# Patient Record
Sex: FEMALE | Race: OTHER | HISPANIC OR LATINO
[De-identification: names, ages, dates, MRNs, and addresses within clinical notes are randomized per-mention and may not be internally consistent; named-entity substitution may affect disease eponyms.]

---

## 2021-08-30 ENCOUNTER — APPOINTMENT (OUTPATIENT)
Dept: UROGYNECOLOGY | Facility: CLINIC | Age: 71
End: 2021-08-30
Payer: SELF-PAY

## 2021-08-30 ENCOUNTER — OUTPATIENT (OUTPATIENT)
Dept: OUTPATIENT SERVICES | Facility: HOSPITAL | Age: 71
LOS: 1 days | Discharge: HOME | End: 2021-08-30

## 2021-08-30 VITALS — SYSTOLIC BLOOD PRESSURE: 140 MMHG | DIASTOLIC BLOOD PRESSURE: 76 MMHG | WEIGHT: 167.19 LBS

## 2021-08-30 DIAGNOSIS — Z87.42 PERSONAL HISTORY OF OTHER DISEASES OF THE FEMALE GENITAL TRACT: ICD-10-CM

## 2021-08-30 DIAGNOSIS — N81.4 UTEROVAGINAL PROLAPSE, UNSPECIFIED: ICD-10-CM

## 2021-08-30 DIAGNOSIS — Z87.891 PERSONAL HISTORY OF NICOTINE DEPENDENCE: ICD-10-CM

## 2021-08-30 DIAGNOSIS — R35.1 NOCTURIA: ICD-10-CM

## 2021-08-30 PROCEDURE — 51701 INSERT BLADDER CATHETER: CPT

## 2021-08-30 PROCEDURE — 99205 OFFICE O/P NEW HI 60 MIN: CPT | Mod: 25

## 2021-08-31 DIAGNOSIS — N81.10 CYSTOCELE, UNSPECIFIED: ICD-10-CM

## 2021-08-31 DIAGNOSIS — N95.2 POSTMENOPAUSAL ATROPHIC VAGINITIS: ICD-10-CM

## 2021-08-31 DIAGNOSIS — R35.1 NOCTURIA: ICD-10-CM

## 2021-08-31 NOTE — ASSESSMENT
[FreeTextEntry1] : Prolapse of anterior vaginal wall-\par The patient was counseled regarding the possible natural progression of prolapse and the clinical consequences of worsening prolapse. The stage and the location of the prolapse was reviewed with the patient (currently Stage III). She was counseled regarding the management strategies including observation,, pessary placement and surgery. At this point she elected observation. The patient voiced understanding and agrees with the plan.\par \par Vaginal atrophy-\par Patient was counseled regarding etiology and treatment of vaginal atrophy. We discussed treatment with vaginal estrogen cream, including R/B/A. She was prescribed vaginal estrogen cream and will start using it as outlined in the counseling portion.

## 2021-08-31 NOTE — REASON FOR VISIT
[Source: ______] : History obtained from [unfilled] [FreeTextEntry1] : [id number] [TWNoteComboBox1] : [place] [FreeTextEntry2] : Vaginal bulge

## 2021-08-31 NOTE — HISTORY OF PRESENT ILLNESS
[FreeTextEntry1] : Pt is a 70 yo para 9 ( x9) with pelvic floor dysfunction here for urogynecologic evaluation. She describes:\par \par Chief PFD: Patient reports bulge and pressure in vagina x 1 month She feels the bulge when she is urinating. This is bothersome to her but not painful. She states that she has to push the bulge up when she cleans herself after urinating. She does endorse post-void dribbling. Urinates 5-6 times per day, nocturia 2-3 times per night.\par \par Pelvic organ prolapse: as above\par \par Stress urinary incontinence: denies\par \par Overactive bladder syndrome: voids 5-6 times/day, nocturia 2-3x, denies urgency, denies urge incontinence\par \par Voiding dysfunction: + Incomplete bladder emptying, + hesitancy\par \par Lower urinary tract/vaginal symptoms: UTIs per year, hematuria, dysuria, bladder pain\par \par Fecal incontinence: Denies\par \par Defecatory dysfunction: Denies constipation, diarrhea. She has 1-2 BM's per day.\par Never had colonoscopy.\par \par Sexual dysfunction: Not currently sexually active (last time was 1 month ago).  She does have a partner and is not sure if she will be sexually active in the future. Denies pain with intercourse.\par \par Pelvic pain: Denies\par \par Vaginal dryness: Denies\par \par Her pelvic floor symptoms are significantly bothersome and negatively impacting her quality of life.

## 2021-08-31 NOTE — PHYSICAL EXAM
[Chaperone Present] : A chaperone was present in the examining room during all aspects of the physical examination [FreeTextEntry1] : Void:  50 cc\par PVR: 15 cc\par Urethra was prepped in sterile fashion and then a sterile catheter was used by me to drain the bladder.\par \par GH: 5    pB: 2.5  TVL: 8  C: -4.5  D: -6  Aa: +3  Ba: +4  Ap: 0  Bp: 0\par  \par neg cough stress test\par + atrophy\par urethral caruncle: neg\par no vestibular tenderness\par prolapse: +\par + urethral hypermobility\par no levator ani tenderness\par urethral tenderness: neg\par bladder tenderness: neg\par cervical tenderness: neg\par palpation of uterus: WNL size, no tenderness\par good sphincter tone\par 1/5 Kegel\par

## 2021-09-01 LAB — BACTERIA UR CULT: NORMAL

## 2021-10-05 ENCOUNTER — APPOINTMENT (OUTPATIENT)
Dept: OPHTHALMOLOGY | Facility: CLINIC | Age: 71
End: 2021-10-05

## 2022-02-28 ENCOUNTER — APPOINTMENT (OUTPATIENT)
Dept: UROGYNECOLOGY | Facility: CLINIC | Age: 72
End: 2022-02-28

## 2022-08-05 ENCOUNTER — OUTPATIENT (OUTPATIENT)
Dept: OUTPATIENT SERVICES | Facility: HOSPITAL | Age: 72
LOS: 1 days | Discharge: HOME | End: 2022-08-05

## 2022-08-05 ENCOUNTER — APPOINTMENT (OUTPATIENT)
Dept: UROGYNECOLOGY | Facility: CLINIC | Age: 72
End: 2022-08-05

## 2022-08-05 VITALS — DIASTOLIC BLOOD PRESSURE: 70 MMHG | SYSTOLIC BLOOD PRESSURE: 130 MMHG

## 2022-08-05 DIAGNOSIS — N81.10 CYSTOCELE, UNSPECIFIED: ICD-10-CM

## 2022-08-05 DIAGNOSIS — N95.2 POSTMENOPAUSAL ATROPHIC VAGINITIS: ICD-10-CM

## 2022-08-05 PROCEDURE — 99214 OFFICE O/P EST MOD 30 MIN: CPT

## 2022-08-05 RX ORDER — ESTRADIOL 0.1 MG/G
0.1 CREAM VAGINAL
Qty: 1 | Refills: 6 | Status: ACTIVE | COMMUNITY
Start: 2021-08-30

## 2022-08-05 RX ORDER — CAPTOPRIL 50 MG/1
TABLET ORAL
Refills: 0 | Status: DISCONTINUED | COMMUNITY
End: 2022-08-05

## 2022-08-05 NOTE — PHYSICAL EXAM
[Chaperone Present] : A chaperone was present in the examining room during all aspects of the physical examination [FreeTextEntry1] : GH: 5-6\par TVL: 10 [No Acute Distress] : in no acute distress [Well developed] : well developed [Well Nourished] : ~L well nourished

## 2022-08-05 NOTE — DISCUSSION/SUMMARY
[FreeTextEntry1] : Incomplete uterovaginal prolapse\par Pt chose observation again as she is not bothered by the prolapse\par \par Vaginal atrophy\par Cont estrogen cream as needed\par \par Follow up as needed

## 2022-08-05 NOTE — HISTORY OF PRESENT ILLNESS
[FreeTextEntry1] : Patient is here for her 12 months follow up visit.  used via phone for today's visit. \par Last seen on 21 as a new pt with a uterovaginal prolapse. \par \par H/o  x 9\par Not sexually active\par \par GH: 5 pB: 2.5 TVL: 8 C: -4.5 D: -6 Aa: +3 Ba: +4 Ap: 0 Bp: 0\par \par Estrace cream uses occasionally\par Pt chose observation. \par  \par Today pt states that she is doing well. She is using the estrogen cream for atrophy sometimes. She is not bothered by the prolapse. Not interested in any intervention at this time.

## 2022-08-24 ENCOUNTER — OUTPATIENT (OUTPATIENT)
Dept: OUTPATIENT SERVICES | Facility: HOSPITAL | Age: 72
LOS: 1 days | Discharge: HOME | End: 2022-08-24

## 2022-08-24 ENCOUNTER — APPOINTMENT (OUTPATIENT)
Dept: OPHTHALMOLOGY | Facility: CLINIC | Age: 72
End: 2022-08-24

## 2022-08-24 PROCEDURE — 92014 COMPRE OPH EXAM EST PT 1/>: CPT

## 2022-09-01 ENCOUNTER — EMERGENCY (EMERGENCY)
Facility: HOSPITAL | Age: 72
LOS: 0 days | Discharge: HOME | End: 2022-09-01
Attending: EMERGENCY MEDICINE | Admitting: EMERGENCY MEDICINE
Payer: SELF-PAY

## 2022-09-01 VITALS
DIASTOLIC BLOOD PRESSURE: 75 MMHG | RESPIRATION RATE: 18 BRPM | OXYGEN SATURATION: 98 % | TEMPERATURE: 99 F | SYSTOLIC BLOOD PRESSURE: 187 MMHG | HEART RATE: 67 BPM

## 2022-09-01 DIAGNOSIS — K02.9 DENTAL CARIES, UNSPECIFIED: ICD-10-CM
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
## 2022-09-01 DIAGNOSIS — K08.89 OTHER SPECIFIED DISORDERS OF TEETH AND SUPPORTING STRUCTURES: ICD-10-CM
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
## 2022-09-01 DIAGNOSIS — S02.5XXA FRACTURE OF TOOTH (TRAUMATIC), INITIAL ENCOUNTER FOR CLOSED FRACTURE: ICD-10-CM

## 2022-09-01 PROCEDURE — 99284 EMERGENCY DEPT VISIT MOD MDM: CPT

## 2022-09-01 NOTE — ED PROVIDER NOTE - OBJECTIVE STATEMENT
L lower molar chipped this AM. Patient is a 72y F presenting for evaluation of a chipped tooth. Patient with pain on palpation of tooth #15. Able to tolerate PO and secretions. No swelling, fevers, chills.

## 2022-09-01 NOTE — ED PROVIDER NOTE - CLINICAL SUMMARY MEDICAL DECISION MAKING FREE TEXT BOX
72yF p/w chipped tooth while eating this morning - no airway compromise, bleeding or infection - no concern for inhaled or swallowed tooth fragment - will escort to dental clinic for further care.

## 2022-09-01 NOTE — ED PROVIDER NOTE - PHYSICAL EXAMINATION
Vital Signs: I have reviewed the initial vital signs.  Constitutional: appears stated age, no acute distress  HEENT: (+) Dental caries, pain with palpation of tooth 15.   Cardiovascular: regular rate, regular rhythm, well-perfused extremities  Respiratory: unlabored respiratory effort,   Gastrointestinal: soft, non-tender abdomen,   Psychiatric: appropriate mood, appropriate affect

## 2022-09-01 NOTE — CONSULT NOTE ADULT - SUBJECTIVE AND OBJECTIVE BOX
Patient is a 72y old  Female who presents with a chief complaint of pain on the upper left. Patient presents with her son who is translating for her    HPI: pain is exacerbated by chewing and cold       PAST MEDICAL & SURGICAL HISTORY:    ( -  ) heart valve replacement  ( -  ) joint replacement  ( -  ) pregnancy    MEDICATIONS  (STANDING):    MEDICATIONS  (PRN):      Allergies      Intolerances        FAMILY HISTORY:      *SOCIAL HISTORY: ( -  ) Tobacco; ( -  ) ETOH    *Last Dental Visit:    Vital Signs Last 24 Hrs  T(C): 37.1 (01 Sep 2022 14:38), Max: 37.1 (01 Sep 2022 14:38)  T(F): 98.8 (01 Sep 2022 14:38), Max: 98.8 (01 Sep 2022 14:38)  HR: 67 (01 Sep 2022 14:38) (67 - 67)  BP: 187/75 (01 Sep 2022 14:38) (187/75 - 187/75)  BP(mean): --  RR: 18 (01 Sep 2022 14:38) (18 - 18)  SpO2: 98% (01 Sep 2022 14:38) (98% - 98%)    Parameters below as of 01 Sep 2022 14:38  Patient On (Oxygen Delivery Method): room air    Vitals taken in clinic:  BP: 149/67  P: 74      LABS: no lab results available for this patient.        EOE:  TMJ ( -  ) clicks                     (  - ) pops                     ( -  ) crepitus             Mandible <<FROM>>             Facial bones and MOM <<grossly intact>>             ( -  ) trismus             ( -  ) lymphadenopathy             ( -  ) swelling             ( -  ) asymmetry             ( -  ) palpation             ( -  ) dyspnea             ( -  ) dysphagia             ( -  ) loss of consciousness    IOE:  permanent multiple carious teeth and multiple missing teeth           hard/soft palate:  (  - ) palatal torus, <<No pathology noted>>           tongue/FOM <<No pathology noted>>           labial/buccal mucosa <<No pathology noted>>           ( +  ) percussion           ( +  ) palpation           ( -  ) swelling            ( -  ) abscess           ( -  ) sinus tract    Dentition present: permanent   Mobility: #16   Caries: #15        *DENTAL RADIOGRAPHS:1 periapical and 1 panoramic radiograph taken    RADIOLOGY & ADDITIONAL STUDIES:    *ASSESSMENT: #15 has caries extending onto roots structure and is non-restorable. #15 is sensitive to percussion.       *PLAN: extraction #15. Patient is informed that #16 is mobile and needs extraction but patient refused to extract #16.     PROCEDURE:   Verbal and written consent given.  Anesthesia: 1 and 1/2 carpules of 4% septocaine with 1:100,000 epinephrine administered as local infiltration.   Treatment: Treatment consequences explained to patient as per OS sheet dated 07/13/00. throat pack placed. #15 elevated and extracted non-surgically. site curetted and irrigated. hemostasis achieved. post-op radiograph taken-negative. post-op instructions given verbal and written. Patient is informed that she has a broken down tooth on the right side upper and lower and she needs a comprehensive exam.     RECOMMENDATIONS:  1) Dental F/U with [facility] clinic for comprehensive dental care.   2) If any difficulty swallowing/breathing, fever occur, return to ER.     Екатерина Zhang, pager #[phone]

## 2022-09-01 NOTE — CONSULT NOTE ADULT - ASSESSMENT
Patient is a 72y old  Female who presents with a chief complaint of pain on the upper left. Patient presents with her son who is translating for her    RECOMMENDATIONS:  1) Dental F/U with [facility] for comprehensive dental care.   2) If any difficulty swallowing/breathing, fever occur, return to ER.     Екатерина Zhang, pager #[phone]

## 2022-09-01 NOTE — ED PROVIDER NOTE - ATTENDING CONTRIBUTION TO CARE
72yF p/w dental injury - chipped her L lower molar eating this morning - no fever, bleeding or purulent drainage - has not seen dentist today

## 2022-09-07 DIAGNOSIS — H25.13 AGE-RELATED NUCLEAR CATARACT, BILATERAL: ICD-10-CM

## 2022-09-07 DIAGNOSIS — H11.042 PERIPHERAL PTERYGIUM, STATIONARY, LEFT EYE: ICD-10-CM

## 2022-09-07 DIAGNOSIS — H11.041 PERIPHERAL PTERYGIUM, STATIONARY, RIGHT EYE: ICD-10-CM

## 2022-09-27 ENCOUNTER — APPOINTMENT (OUTPATIENT)
Dept: OPHTHALMOLOGY | Facility: CLINIC | Age: 72
End: 2022-09-27

## 2022-10-06 ENCOUNTER — NON-APPOINTMENT (OUTPATIENT)
Age: 72
End: 2022-10-06

## 2022-10-06 ENCOUNTER — OUTPATIENT (OUTPATIENT)
Dept: OUTPATIENT SERVICES | Facility: HOSPITAL | Age: 72
LOS: 1 days | Discharge: HOME | End: 2022-10-06

## 2022-10-06 ENCOUNTER — APPOINTMENT (OUTPATIENT)
Dept: INTERNAL MEDICINE | Facility: CLINIC | Age: 72
End: 2022-10-06

## 2022-10-06 VITALS — SYSTOLIC BLOOD PRESSURE: 142 MMHG | DIASTOLIC BLOOD PRESSURE: 78 MMHG

## 2022-10-06 VITALS
DIASTOLIC BLOOD PRESSURE: 82 MMHG | TEMPERATURE: 96.7 F | HEIGHT: 64.5 IN | BODY MASS INDEX: 28.67 KG/M2 | WEIGHT: 170 LBS | HEART RATE: 71 BPM | SYSTOLIC BLOOD PRESSURE: 151 MMHG | OXYGEN SATURATION: 96 %

## 2022-10-06 DIAGNOSIS — Z86.79 PERSONAL HISTORY OF OTHER DISEASES OF THE CIRCULATORY SYSTEM: ICD-10-CM

## 2022-10-06 DIAGNOSIS — E55.9 VITAMIN D DEFICIENCY, UNSPECIFIED: ICD-10-CM

## 2022-10-06 DIAGNOSIS — Z86.69 PERSONAL HISTORY OF OTHER DISEASES OF THE NERVOUS SYSTEM AND SENSE ORGANS: ICD-10-CM

## 2022-10-06 DIAGNOSIS — Z12.39 ENCOUNTER FOR OTHER SCREENING FOR MALIGNANT NEOPLASM OF BREAST: ICD-10-CM

## 2022-10-06 DIAGNOSIS — I10 ESSENTIAL (PRIMARY) HYPERTENSION: ICD-10-CM

## 2022-10-06 PROCEDURE — 99203 OFFICE O/P NEW LOW 30 MIN: CPT | Mod: GC

## 2022-10-06 RX ORDER — CAPTOPRIL 25 MG/1
25 TABLET ORAL DAILY
Qty: 30 | Refills: 5 | Status: ACTIVE | COMMUNITY
Start: 2022-10-06 | End: 1900-01-01

## 2022-10-06 NOTE — HISTORY OF PRESENT ILLNESS
[FreeTextEntry1] : establish care\par Needs clearance for cataract surgery.  [de-identified] : 72 year old female with past medical history of HTN presents to establish care. She saw her ophthalmologist in August for cataract surgery. She needs clearance from her PCP before cataract surgery as she has hypertension. Has no cardiopulmonary symptoms feels well.

## 2022-10-06 NOTE — ASSESSMENT
[FreeTextEntry1] : 72 year old female with past medical history of HTN presents to establish care. She saw her ophthalmologist in August for cataract surgery. She needs clearance from her PCP before cataract surgery as she has hypertension\par \par #HTN\par - Patient BP in office is 151/82\par - Patient hasn't taken Captopril in 2 days \par - Patient reports that she ran out of captopril \par - refill given\par \par \par # Pre-op\par - low risk surgical patient. However, would suggest  getting baseline labs prior to cataract surgery.\par \par \par #HCM\par - patient is planning on returning to [place] however, no suggest Pneumonia and Flu vaccine\par - check mammogram

## 2022-10-06 NOTE — PHYSICAL EXAM
[No Acute Distress] : no acute distress [No JVD] : no jugular venous distention [Soft] : abdomen soft [Non Tender] : non-tender [Non-distended] : non-distended

## 2022-10-06 NOTE — REVIEW OF SYSTEMS
[Headache] : headache [Fever] : no fever [Chills] : no chills [Night Sweats] : no night sweats [Discharge] : no discharge [Vision Problems] : no vision problems [Chest Pain] : no chest pain [Palpitations] : no palpitations [Abdominal Pain] : no abdominal pain

## 2022-10-07 PROBLEM — E55.9 VITAMIN D DEFICIENCY: Status: ACTIVE | Noted: 2022-10-07

## 2022-10-07 LAB
ALBUMIN SERPL ELPH-MCNC: 4.7 G/DL
ALP BLD-CCNC: 130 U/L
ALT SERPL-CCNC: 26 U/L
ANION GAP SERPL CALC-SCNC: 12 MMOL/L
AST SERPL-CCNC: 23 U/L
BASOPHILS # BLD AUTO: 0.09 K/UL
BASOPHILS NFR BLD AUTO: 1.3 %
BILIRUB SERPL-MCNC: 0.5 MG/DL
BUN SERPL-MCNC: 17 MG/DL
CALCIUM SERPL-MCNC: 9.7 MG/DL
CHLORIDE SERPL-SCNC: 102 MMOL/L
CHOLEST SERPL-MCNC: 210 MG/DL
CO2 SERPL-SCNC: 25 MMOL/L
CREAT SERPL-MCNC: 0.9 MG/DL
EGFR: 68 ML/MIN/1.73M2
EOSINOPHIL # BLD AUTO: 0.17 K/UL
EOSINOPHIL NFR BLD AUTO: 2.5 %
ESTIMATED AVERAGE GLUCOSE: 123 MG/DL
GLUCOSE SERPL-MCNC: 120 MG/DL
HBA1C MFR BLD HPLC: 5.9 %
HCT VFR BLD CALC: 39.4 %
HDLC SERPL-MCNC: 42 MG/DL
HGB BLD-MCNC: 13 G/DL
IMM GRANULOCYTES NFR BLD AUTO: 0.1 %
LDLC SERPL CALC-MCNC: 131 MG/DL
LYMPHOCYTES # BLD AUTO: 2.38 K/UL
LYMPHOCYTES NFR BLD AUTO: 35.4 %
MAN DIFF?: NORMAL
MCHC RBC-ENTMCNC: 30 PG
MCHC RBC-ENTMCNC: 33 G/DL
MCV RBC AUTO: 90.8 FL
MONOCYTES # BLD AUTO: 0.59 K/UL
MONOCYTES NFR BLD AUTO: 8.8 %
NEUTROPHILS # BLD AUTO: 3.49 K/UL
NEUTROPHILS NFR BLD AUTO: 51.9 %
NONHDLC SERPL-MCNC: 168 MG/DL
PLATELET # BLD AUTO: 380 K/UL
POTASSIUM SERPL-SCNC: 5 MMOL/L
PROT SERPL-MCNC: 7.5 G/DL
RBC # BLD: 4.34 M/UL
RBC # FLD: 12.8 %
SODIUM SERPL-SCNC: 139 MMOL/L
TRIGL SERPL-MCNC: 184 MG/DL
WBC # FLD AUTO: 6.73 K/UL

## 2022-10-07 RX ORDER — CHOLECALCIFEROL (VITAMIN D3) 1250 MCG
1.25 MG CAPSULE ORAL
Qty: 12 | Refills: 0 | Status: ACTIVE | COMMUNITY
Start: 2022-10-07 | End: 1900-01-01

## 2022-10-12 DIAGNOSIS — H26.9 UNSPECIFIED CATARACT: ICD-10-CM

## 2022-10-12 DIAGNOSIS — E55.9 VITAMIN D DEFICIENCY, UNSPECIFIED: ICD-10-CM

## 2022-10-12 DIAGNOSIS — I10 ESSENTIAL (PRIMARY) HYPERTENSION: ICD-10-CM

## 2022-10-27 ENCOUNTER — APPOINTMENT (OUTPATIENT)
Dept: INTERNAL MEDICINE | Facility: CLINIC | Age: 72
End: 2022-10-27

## 2022-10-27 ENCOUNTER — OUTPATIENT (OUTPATIENT)
Dept: OUTPATIENT SERVICES | Facility: HOSPITAL | Age: 72
LOS: 1 days | Discharge: HOME | End: 2022-10-27

## 2022-10-27 VITALS
HEART RATE: 66 BPM | SYSTOLIC BLOOD PRESSURE: 147 MMHG | TEMPERATURE: 98.1 F | OXYGEN SATURATION: 97 % | DIASTOLIC BLOOD PRESSURE: 83 MMHG | WEIGHT: 175 LBS | HEIGHT: 64 IN | BODY MASS INDEX: 29.88 KG/M2

## 2022-10-27 DIAGNOSIS — H26.9 UNSPECIFIED CATARACT: ICD-10-CM

## 2022-10-27 DIAGNOSIS — Z00.00 ENCOUNTER FOR GENERAL ADULT MEDICAL EXAMINATION W/OUT ABNORMAL FINDINGS: ICD-10-CM

## 2022-10-27 DIAGNOSIS — R73.03 PREDIABETES.: ICD-10-CM

## 2022-10-27 DIAGNOSIS — I10 ESSENTIAL (PRIMARY) HYPERTENSION: ICD-10-CM

## 2022-10-27 LAB — 25(OH)D3 SERPL-MCNC: 16 NG/ML

## 2022-10-27 PROCEDURE — 99213 OFFICE O/P EST LOW 20 MIN: CPT | Mod: GC

## 2022-10-27 NOTE — PHYSICAL EXAM
[No Acute Distress] : no acute distress [Well Nourished] : well nourished [Well Developed] : well developed [Well-Appearing] : well-appearing [Normal Sclera/Conjunctiva] : normal sclera/conjunctiva [EOMI] : extraocular movements intact [Normal Outer Ear/Nose] : the outer ears and nose were normal in appearance [Normal Oropharynx] : the oropharynx was normal [No JVD] : no jugular venous distention [Supple] : supple [No Respiratory Distress] : no respiratory distress  [No Accessory Muscle Use] : no accessory muscle use [Clear to Auscultation] : lungs were clear to auscultation bilaterally [Normal Rate] : normal rate  [Regular Rhythm] : with a regular rhythm [Normal S1, S2] : normal S1 and S2 [No Murmur] : no murmur heard [Pedal Pulses Present] : the pedal pulses are present [No Edema] : there was no peripheral edema [Soft] : abdomen soft [Non Tender] : non-tender [Non-distended] : non-distended [Normal Bowel Sounds] : normal bowel sounds [No Joint Swelling] : no joint swelling [Grossly Normal Strength/Tone] : grossly normal strength/tone [No Rash] : no rash [Coordination Grossly Intact] : coordination grossly intact [No Focal Deficits] : no focal deficits [Normal Insight/Judgement] : insight and judgment were intact

## 2022-10-30 PROBLEM — I10 HYPERTENSION: Status: ACTIVE | Noted: 2022-10-06

## 2022-10-30 PROBLEM — Z00.00 ENCOUNTER FOR PREVENTIVE HEALTH EXAMINATION: Status: ACTIVE | Noted: 2021-08-25

## 2022-10-30 PROBLEM — R73.03 PREDIABETES: Status: ACTIVE | Noted: 2022-10-30

## 2022-10-30 NOTE — REVIEW OF SYSTEMS
[Fever] : no fever [Chills] : no chills [Fatigue] : no fatigue [Night Sweats] : no night sweats [Recent Change In Weight] : ~T no recent weight change [Vision Problems] : no vision problems [Hearing Loss] : no hearing loss [Sore Throat] : no sore throat [Postnasal Drip] : no postnasal drip [Chest Pain] : no chest pain [Palpitations] : no palpitations [Lower Ext Edema] : no lower extremity edema [Orthopnea] : no orthopnea [Paroxysmal Nocturnal Dyspnea] : no paroxysmal nocturnal dyspnea [Shortness Of Breath] : no shortness of breath [Wheezing] : no wheezing [Cough] : no cough [Abdominal Pain] : no abdominal pain [Nausea] : no nausea [Constipation] : no constipation [Diarrhea] : diarrhea [Vomiting] : no vomiting [Heartburn] : no heartburn [Dysuria] : no dysuria [Nocturia] : no nocturia [Frequency] : no frequency [Joint Pain] : no joint pain [Muscle Weakness] : no muscle weakness [Muscle Pain] : no muscle pain [Itching] : no itching [Skin Rash] : no skin rash [Headache] : no headache [Dizziness] : no dizziness [Insomnia] : no insomnia [Anxiety] : no anxiety

## 2022-10-30 NOTE — HISTORY OF PRESENT ILLNESS
[Family Member] : family member [FreeTextEntry1] : BP Check and \par pre-op clearance for cataract surgery [de-identified] : 73 yo F w/pmh pre-diabetes and HTN presenting for 2 week f/u for cataract surgery clearance, currently scheduled for next Monday. On prior office visit 2 weeks agp bp was > 150s and patient's captopril was refilled. BP contrl is improved today, patient taking medication regularly. No chest pain, sob, difficulty breathing, nausea, vomiting, no dysuria, changes in bowel habits. No fever, chills, or weight loss. Patient feels well at her current baseline. Patient accompanied by son at bedside.

## 2022-10-30 NOTE — ASSESSMENT
[FreeTextEntry1] : 72 year old female with past medical history of HTN and pre-diabetes here for 2 week follow-up for pre-op clearance. She saw her ophthalmologist in August for cataract surgery. She needs clearance from her PCP before cataract surgery as she has hypertension\par \par #HTN\par - Patient BP in office is 151/82 2 weeks ago, pateint hadn't been taking captopril at that time\par - Captopril refill was given, patient is now taking regularly, bp controlled 140's today in office \par \par #Pre-diabetes A1C 5.9\par - Re-check routine labs, RTC in 2 months\par - Diet and lifestyle modifications discussed w/patient to decrease sugar/carbohydrate intake and f/u in 2 months to re-check\par \par # Pre-op\par - low risk surgical patient for low-risk procedure\par - Baseline labs reviewed, notable for HLD and pre-diabetes\par - Lifestyle modifications and diet discussed with patient for A1C 5.9\par - Electrolytes and CBC within normal\par - RTC in 2 months\par - Patient is medically stable, all labs reviewed and discussed with patient, bp controlled on current anti-hypertensive regimen, and she is medically cleared for cataract surgery\par \par #HCM\par - Flu/pna vaccines administered prior visit 10/06

## 2022-10-31 ENCOUNTER — TRANSCRIPTION ENCOUNTER (OUTPATIENT)
Age: 72
End: 2022-10-31

## 2022-10-31 ENCOUNTER — RESULT REVIEW (OUTPATIENT)
Age: 72
End: 2022-10-31

## 2022-10-31 ENCOUNTER — OUTPATIENT (OUTPATIENT)
Dept: OUTPATIENT SERVICES | Facility: HOSPITAL | Age: 72
LOS: 1 days | Discharge: HOME | End: 2022-10-31

## 2022-10-31 VITALS — SYSTOLIC BLOOD PRESSURE: 132 MMHG | RESPIRATION RATE: 17 BRPM | DIASTOLIC BLOOD PRESSURE: 65 MMHG | HEART RATE: 68 BPM

## 2022-10-31 VITALS
DIASTOLIC BLOOD PRESSURE: 78 MMHG | RESPIRATION RATE: 17 BRPM | SYSTOLIC BLOOD PRESSURE: 179 MMHG | OXYGEN SATURATION: 98 % | TEMPERATURE: 98 F | WEIGHT: 173.94 LBS | HEIGHT: 65 IN | HEART RATE: 68 BPM

## 2022-10-31 PROCEDURE — 88304 TISSUE EXAM BY PATHOLOGIST: CPT | Mod: 26

## 2022-10-31 RX ORDER — CAPTOPRIL 12.5 MG/1
1 TABLET ORAL
Qty: 0 | Refills: 0 | DISCHARGE

## 2022-10-31 RX ORDER — CHOLECALCIFEROL (VITAMIN D3) 125 MCG
0 CAPSULE ORAL
Qty: 0 | Refills: 0 | DISCHARGE

## 2022-10-31 NOTE — ASU PATIENT PROFILE, ADULT - FALL HARM RISK - UNIVERSAL INTERVENTIONS
Bed in lowest position, wheels locked, appropriate side rails in place/Call bell, personal items and telephone in reach/Instruct patient to call for assistance before getting out of bed or chair/Non-slip footwear when patient is out of bed/[place] to call system/Physically safe environment - no spills, clutter or unnecessary equipment/Purposeful Proactive Rounding/Room/bathroom lighting operational, light cord in reach

## 2022-10-31 NOTE — ASU PATIENT PROFILE, ADULT - AS SC BRADEN FRICTION
(3) no apparent problem Manual Repair Warning Statement: We plan on removing the manually selected variable below in favor of our much easier automatic structured text blocks found in the previous tab. We decided to do this to help make the flow better and give you the full power of structured data. Manual selection is never going to be ideal in our platform and I would encourage you to avoid using manual selection from this point on, especially since I will be sunsetting this feature. It is important that you do one of two things with the customized text below. First, you can save all of the text in a word file so you can have it for future reference. Second, transfer the text to the appropriate area in the Library tab. Lastly, if there is a flap or graft type which we do not have you need to let us know right away so I can add it in before the variable is hidden. No need to panic, we plan to give you roughly 6 months to make the change.

## 2022-11-01 ENCOUNTER — OUTPATIENT (OUTPATIENT)
Dept: OUTPATIENT SERVICES | Facility: HOSPITAL | Age: 72
LOS: 1 days | Discharge: HOME | End: 2022-11-01

## 2022-11-01 ENCOUNTER — APPOINTMENT (OUTPATIENT)
Dept: OPHTHALMOLOGY | Facility: CLINIC | Age: 72
End: 2022-11-01

## 2022-11-01 DIAGNOSIS — I10 ESSENTIAL (PRIMARY) HYPERTENSION: ICD-10-CM

## 2022-11-01 DIAGNOSIS — H25.13 AGE-RELATED NUCLEAR CATARACT, BILATERAL: ICD-10-CM

## 2022-11-01 DIAGNOSIS — H26.9 UNSPECIFIED CATARACT: ICD-10-CM

## 2022-11-01 DIAGNOSIS — R73.03 PREDIABETES: ICD-10-CM

## 2022-11-01 DIAGNOSIS — H11.041 PERIPHERAL PTERYGIUM, STATIONARY, RIGHT EYE: ICD-10-CM

## 2022-11-01 DIAGNOSIS — H11.042 PERIPHERAL PTERYGIUM, STATIONARY, LEFT EYE: ICD-10-CM

## 2022-11-01 PROCEDURE — 99024 POSTOP FOLLOW-UP VISIT: CPT

## 2022-11-02 LAB — SURGICAL PATHOLOGY STUDY: SIGNIFICANT CHANGE UP

## 2022-11-03 DIAGNOSIS — H11.001 UNSPECIFIED PTERYGIUM OF RIGHT EYE: ICD-10-CM

## 2022-11-03 DIAGNOSIS — I10 ESSENTIAL (PRIMARY) HYPERTENSION: ICD-10-CM

## 2022-11-03 DIAGNOSIS — Z79.899 OTHER LONG TERM (CURRENT) DRUG THERAPY: ICD-10-CM

## 2022-11-22 ENCOUNTER — OUTPATIENT (OUTPATIENT)
Dept: OUTPATIENT SERVICES | Facility: HOSPITAL | Age: 72
LOS: 1 days | Discharge: HOME | End: 2022-11-22

## 2022-11-22 ENCOUNTER — APPOINTMENT (OUTPATIENT)
Dept: OPHTHALMOLOGY | Facility: CLINIC | Age: 72
End: 2022-11-22

## 2022-11-22 PROCEDURE — 99024 POSTOP FOLLOW-UP VISIT: CPT

## 2022-11-30 DIAGNOSIS — H25.13 AGE-RELATED NUCLEAR CATARACT, BILATERAL: ICD-10-CM

## 2022-11-30 DIAGNOSIS — H11.041 PERIPHERAL PTERYGIUM, STATIONARY, RIGHT EYE: ICD-10-CM

## 2022-11-30 DIAGNOSIS — H11.042 PERIPHERAL PTERYGIUM, STATIONARY, LEFT EYE: ICD-10-CM

## 2022-12-06 ENCOUNTER — OUTPATIENT (OUTPATIENT)
Dept: OUTPATIENT SERVICES | Facility: HOSPITAL | Age: 72
LOS: 1 days | Discharge: HOME | End: 2022-12-06

## 2022-12-06 ENCOUNTER — APPOINTMENT (OUTPATIENT)
Dept: OPHTHALMOLOGY | Facility: CLINIC | Age: 72
End: 2022-12-06
Payer: SELF-PAY

## 2022-12-06 ENCOUNTER — EMERGENCY (EMERGENCY)
Facility: HOSPITAL | Age: 72
LOS: 0 days | Discharge: HOME | End: 2022-12-06
Attending: EMERGENCY MEDICINE | Admitting: EMERGENCY MEDICINE
Payer: SELF-PAY

## 2022-12-06 VITALS
HEART RATE: 60 BPM | HEIGHT: 65 IN | SYSTOLIC BLOOD PRESSURE: 179 MMHG | DIASTOLIC BLOOD PRESSURE: 108 MMHG | TEMPERATURE: 98 F | RESPIRATION RATE: 20 BRPM | OXYGEN SATURATION: 98 %

## 2022-12-06 DIAGNOSIS — R22.2 LOCALIZED SWELLING, MASS AND LUMP, TRUNK: ICD-10-CM

## 2022-12-06 DIAGNOSIS — I10 ESSENTIAL (PRIMARY) HYPERTENSION: ICD-10-CM

## 2022-12-06 PROCEDURE — 92012 INTRM OPH EXAM EST PATIENT: CPT

## 2022-12-06 PROCEDURE — 71120 X-RAY EXAM BREASTBONE 2/>VWS: CPT | Mod: 26

## 2022-12-06 PROCEDURE — 99284 EMERGENCY DEPT VISIT MOD MDM: CPT

## 2022-12-06 NOTE — ED PROVIDER NOTE - NSFOLLOWUPINSTRUCTIONS_ED_ALL_ED_FT
Please make sure to follow up with your primary care doctor in 3 days.    Our Emergency Department Referral Coordinators will be reaching out ot you in the next 24-48 hours from 9:00am to 5:00pm (Monday to Friday) with a follow up appointment. Please expect a phone call from the hospital in that time frame. If you do not receive a call or if you have any questions or concerns, you can reach them at [phone] or [phone].

## 2022-12-06 NOTE — ED PROVIDER NOTE - PHYSICAL EXAMINATION
CONSTITUTIONAL: Well-appearing; in no apparent distress.   RESPIRATORY: No signs of respiratory distress. Lung sounds are clear in all lobes bilaterally without rales, rhonchi, or wheezes.  CARDIOVASCULAR: Regular rate and rhythm.   GI: Abdomen is soft, non-tender, and without distention.   Skin: 4 cm x 4 cm lump noticed in the sternum of the chest with no erythema and nontender to palpation.   NEURO: A & O x 3. Normal speech. No focal deficit.  PSYCHOLOGICAL: Appropriate mood and affect. Good judgement and insight.

## 2022-12-06 NOTE — ED PROVIDER NOTE - ATTENDING APP SHARED VISIT CONTRIBUTION OF CARE
72-year-old female history of hypertension now noted to have a chest wall lump and swelling occasional times year and a half.  Occasional increases when patient gets nervous.  None currently.  No fever or chest pain.  No shortness of breath.  On exam, nontoxic normal gait no focal neurodeficits lungs clear mild tenderness over the xiphoid.  No erythema, swelling, tenderness.

## 2022-12-06 NOTE — ED PROVIDER NOTE - PATIENT PORTAL LINK FT
You can access the FollowMyHealth Patient Portal offered by [facility] by registering at the following website: [email or web address]. By joining [facility]’s FollowMyHealth portal, you will also be able to view your health information using other applications (apps) compatible with our system.

## 2022-12-06 NOTE — ED ADULT NURSE NOTE - OBJECTIVE STATEMENT
patient is c/o a  "lump" in her chest. states it moves when she touches it. denies any sob/dizziness/pain

## 2022-12-06 NOTE — ED PROVIDER NOTE - NS ED ATTENDING STATEMENT MOD
This was a shared visit with the KRISTIE. I reviewed and verified the documentation and independently performed the documented:

## 2022-12-06 NOTE — ED ADULT TRIAGE NOTE - TEMPERATURE IN CELSIUS (DEGREES C)
unable to obtain full ROS 2/2 pt's lethargy.    Daughter reports no fever, chills  + constipation  + poor appetite  + abd pain  + lethargy  + Jaundice
36.5

## 2022-12-06 NOTE — ED ADULT TRIAGE NOTE - CHIEF COMPLAINT QUOTE
Patient's son states patient noticed a lump in her chest cavity x months, son states it moves, denies CP, denies palpitations

## 2022-12-06 NOTE — ED PROVIDER NOTE - OBJECTIVE STATEMENT
72-year-old female with past medical history of hypertension who presents with a lump in the middle of her chest.  Reports that symptom started 1 and half year ago, but patient never mentioned this to anyone.  Reports the patient spoke with her son yesterday and was told to go to the ER for evaluation.  Reports that the size of the lump increases as she gets nervous or excited.  Denies pain or any discomfort associated with the lump.  Denies fever, shortness of breath, chest pain, nausea, vomiting, and abdominal pain.

## 2022-12-06 NOTE — ED PROVIDER NOTE - NS ED ROS FT
Constitutional: Negative for fever, chills, and fatigue.  HENT: Negative for headache.  Cardiovascular: Negative for chest pain, and palpitation.  Respiratory: Negative for SOB, wheezing, cough and sputum production.  Gastrointestinal: Negative for nausea, vomiting, abdominal pain  Musculoskeletal: + lump in the chest. Negative for joint swelling, arthralgias, back pain, neck pain, and calf cramps.  Hematological: Does not bruise/bleed easily.

## 2022-12-13 ENCOUNTER — APPOINTMENT (OUTPATIENT)
Dept: CARDIOTHORACIC SURGERY | Facility: CLINIC | Age: 72
End: 2022-12-13

## 2022-12-15 DIAGNOSIS — H11.041 PERIPHERAL PTERYGIUM, STATIONARY, RIGHT EYE: ICD-10-CM

## 2022-12-15 DIAGNOSIS — H11.042 PERIPHERAL PTERYGIUM, STATIONARY, LEFT EYE: ICD-10-CM

## 2022-12-15 DIAGNOSIS — H25.13 AGE-RELATED NUCLEAR CATARACT, BILATERAL: ICD-10-CM

## 2023-01-10 ENCOUNTER — APPOINTMENT (OUTPATIENT)
Dept: OPHTHALMOLOGY | Facility: CLINIC | Age: 73
End: 2023-01-10

## 2025-06-06 NOTE — PRE-ANESTHESIA EVALUATION ADULT - NSANTHALCOHOLSD_GEN_ALL_CORE
Thank you very much for coming.  I am very happy to see you again.    I do understand that you still have some episodes of fatigue, palpitations, lightheadedness, and even some shortness of breath with activity.  I am glad to hear that it is less than before.    I also am glad to see that most of your blood pressure readings are very good.  The occasional 160 for your first number does happen, but for the most part, your blood pressure is averaging 130s for the first number, and your heart rate is between 50s and 70s.    Please continue taking your METOPROLOL SUCCINATE, as well as your LOSARTAN.  Please continue drinking lots of fluids throughout the day.  Please continue staying physically active with some walking.  All of these contribute to better blood pressure readings!    Let us see what Dr. Martins, CARDIOLOGY, recommends.      I am also glad to hear that you have not had any urine symptoms.  Again, please continue to drink lots of fluids throughout the day.  Please urinate often while awake.  Please urinate before you need to urinate, so that you can minimize any accidents.  Again, thank you for your efforts.      I do understand that you still have RIGHT SIDED BACK PAIN.  Indeed, you have a sore spot on your back which coincides with a column of a MUSCLE SPASM.  MOIST HEAT will help relax your back muscles.  Ice will not help at this time.  Please continue using your muscle relaxant TIZANIDINE.  Please continue using TYLENOL EXTRA STRENGTH 500 mg, 2 tablets by mouth every 6 hours as needed for pain.  You can take up to 6 tablets of acetaminophen and 24 hours.  It will not hurt your kidneys, and it will not interfere with your other medications.    Get yourself some DICLOFENAC GEL and apply it to the area of your back that hurts.  You can also try BIOFREEZE, but do not use them both at the same time.  You can experiment and see which one works better!    If you are no better by Wednesday next week, please let  me know, and I will order PHYSICAL THERAPY.      I do understand that your nose still runs.  Please continue using your XYZAL.  Stop using the FLUTICASONE nose spray at this time.  Instead, we will try IPRATROPIUM nasal pump, 1 spray to each nostril 3 times a day.  Watch out for dryness.      Please let me see you in 4 months.  Until then, if Dr. Martins orders lab work, please do some lab work for me as well.    Please continue to take care of yourself and each other, and please continue to pray for our recovery from this pandemic.  Take care and God bless.  I hope you both have a happy summer!            0  Return in 4 months.  40 minutes please.  Consider repeat FASTING laboratory examinations.  Review interval history with cardiology, possible physical therapy.  Reassess debility, rule out self-neglect.  Review preventive strategies, vaccination profile, possible bone density.            0     No